# Patient Record
Sex: FEMALE | Race: WHITE | NOT HISPANIC OR LATINO | Employment: UNEMPLOYED | URBAN - METROPOLITAN AREA
[De-identification: names, ages, dates, MRNs, and addresses within clinical notes are randomized per-mention and may not be internally consistent; named-entity substitution may affect disease eponyms.]

---

## 2023-03-04 NOTE — PROGRESS NOTES
[facility]  MD Tenisha Mullins, NP-C    Chief Complaint:   Shauna Soto is a 62 y.o. female presenting today for her a new patient appointment to establish breast care.. She is due for Physical Examination.  She reports no interval changes.     History of Present Illness:   Mrs. Soto first presents on 2023 to establish breast care given her bilateral mastectomy (Dec 2019)by Dr. Keita due to being CHEK2 Positive. MD:::Cruz Chang MD    Past Medical History:   Diagnosis Date    Anxiety     Biallelic mutation of CHEK2 gene     Depression     Hypothyroidism     Insulin resistance     Osteopenia 08/15/2022    Osteopenia of spine Normal Hip    Vitamin D deficiency       Past Surgical History:   Procedure Laterality Date    BILATERAL MASTECTOMY Bilateral 2019    CHEK2 gene    BILATERAL TUBAL LIGATION  1993    BREAST REVISION SURGERY       SECTION  1982     SECTION  1985    WISDOM TOOTH EXTRACTION          Current Outpatient Medications:     azelastine (ASTELIN) 137 mcg (0.1 %) nasal spray, SPRAY 1 SPRAY INTO EACH NOSTRIL TWICE A DAY AS DIRECTED, Disp: , Rfl:     estradioL (ESTRACE) 0.01 % (0.1 mg/gram) vaginal cream, Insert 1 gram vaginally twice weekly, Disp: 42.5 g, Rfl: 5    levothyroxine (SYNTHROID) 112 MCG tablet, Take 112 mcg by mouth once daily., Disp: , Rfl:     metFORMIN (GLUCOPHAGE-XR) 500 MG ER 24hr tablet, Take 1,500 mg by mouth once daily., Disp: , Rfl:    Review of patient's allergies indicates:   Allergen Reactions    Sulfa (sulfonamide antibiotics)     Cefprozil      Other reaction(s): Itching    Codeine      Other reaction(s): Itching    Penicillins      Other reaction(s): Itching      Social History     Tobacco Use    Smoking status: Former    Smokeless tobacco: Never   Substance Use Topics    Alcohol use: Not Currently      Family History   Problem Relation Age of Onset    Hypertension  Father     Diabetes Mellitus Father     Breast cancer Sister       Review of Systems   Genitourinary:  Negative for hot flashes.   Integumentary:  Negative for color change, rash, mole/lesion, breast mass, breast discharge and breast tenderness.   Breast: Negative for mass and tenderness   Physical Exam   Constitutional: She is cooperative.   HENT:   Head: Normocephalic.   Pulmonary/Chest: She exhibits no mass. Right breast exhibits no mass, no skin change and no tenderness. Left breast exhibits no mass, no skin change and no tenderness. No breast swelling.   Bilateral breasts are surgically absent and showed no signs of malignancy.    Abdominal: Normal appearance.   Genitourinary: No breast swelling.   Lymphadenopathy:        Right: No supraclavicular adenopathy present.        Left: No supraclavicular adenopathy present.   Neurological: She is alert.   Skin: No rash noted.        Assessment/Plan  1. Biallelic mutation of CHEK2 gene  Assessment & Plan:  We discussed her PE findings and the statistics regarding bilateral prophylactic mastectomy with her mutation.  She knows that she has reduced her risk of a future breast cancer by about 95% (but not 100%).  She knows that she has minimal breast tissue left behind at mastectomy - but this tissue can become cancerous.  Due to this, she will need to continue monthly SBE and report any and all changes in the interim.  She will continue to be followed on an annual basis.                 Medical Decision Making:  It is my impression that this patient suffers all conditions contained in this medical document.  Each of these conditions did affect our plan of care and my medical decision making today.  It is my opinion that the medical decision making concerning this patient was of minimal  difficulty based on the aforementioned conditions.  Any further recommendations will be communicated to the patient by me.  I have reviewed and verified her allergies, list of  medications, medical and surgical histories, social history, and a pertinent review of symptoms.         Follow up:  1 year and prn    For:  PE

## 2023-03-04 NOTE — ASSESSMENT & PLAN NOTE
We discussed her PE findings and the statistics regarding bilateral prophylactic mastectomy with her mutation.  She knows that she has reduced her risk of a future breast cancer by about 95% (but not 100%).  She knows that she has minimal breast tissue left behind at mastectomy - but this tissue can become cancerous.  Due to this, she will need to continue monthly SBE and report any and all changes in the interim.  She will continue to be followed on an annual basis.

## 2023-03-06 ENCOUNTER — OFFICE VISIT (OUTPATIENT)
Dept: SURGERY | Facility: CLINIC | Age: 63
End: 2023-03-06
Payer: COMMERCIAL

## 2023-03-06 VITALS — WEIGHT: 150 LBS | HEIGHT: 68 IN | BODY MASS INDEX: 22.73 KG/M2

## 2023-03-06 DIAGNOSIS — Z15.01 BIALLELIC MUTATION OF CHEK2 GENE: ICD-10-CM

## 2023-03-06 DIAGNOSIS — Z15.09 BIALLELIC MUTATION OF CHEK2 GENE: ICD-10-CM

## 2023-03-06 DIAGNOSIS — Z15.89 BIALLELIC MUTATION OF CHEK2 GENE: ICD-10-CM

## 2023-03-06 PROCEDURE — 3008F PR BODY MASS INDEX (BMI) DOCUMENTED: ICD-10-PCS | Mod: CPTII,S$GLB,, | Performed by: SPECIALIST

## 2023-03-06 PROCEDURE — 3008F BODY MASS INDEX DOCD: CPT | Mod: CPTII,S$GLB,, | Performed by: SPECIALIST

## 2023-03-06 PROCEDURE — 1159F PR MEDICATION LIST DOCUMENTED IN MEDICAL RECORD: ICD-10-PCS | Mod: CPTII,S$GLB,, | Performed by: SPECIALIST

## 2023-03-06 PROCEDURE — 99203 OFFICE O/P NEW LOW 30 MIN: CPT | Mod: S$GLB,,, | Performed by: SPECIALIST

## 2023-03-06 PROCEDURE — 99203 PR OFFICE/OUTPT VISIT, NEW, LEVL III, 30-44 MIN: ICD-10-PCS | Mod: S$GLB,,, | Performed by: SPECIALIST

## 2023-03-06 PROCEDURE — 1160F PR REVIEW ALL MEDS BY PRESCRIBER/CLIN PHARMACIST DOCUMENTED: ICD-10-PCS | Mod: CPTII,S$GLB,, | Performed by: SPECIALIST

## 2023-03-06 PROCEDURE — 1160F RVW MEDS BY RX/DR IN RCRD: CPT | Mod: CPTII,S$GLB,, | Performed by: SPECIALIST

## 2023-03-06 PROCEDURE — 1159F MED LIST DOCD IN RCRD: CPT | Mod: CPTII,S$GLB,, | Performed by: SPECIALIST

## 2023-05-03 ENCOUNTER — PATIENT MESSAGE (OUTPATIENT)
Dept: RESEARCH | Facility: HOSPITAL | Age: 63
End: 2023-05-03
Payer: COMMERCIAL

## 2024-04-24 NOTE — PROGRESS NOTES
Date of Service: 5/15/2024    Chief Complaint:   Shauna Soto is a 63 y.o. female presenting today for her annual evaluation.  She is due for a Physical Examination.  She reports no interval changes.     History of Present Illness: Mrs. Soto first presents on 2023 to establish breast care given her bilateral mastectomy (Dec 2019)by Dr. Keita due to being CHEK2 Positive. MD:::Cruz Chang MD    Past Medical History:   Diagnosis Date    Anxiety     Biallelic mutation of CHEK2 gene     Depression     History of abnormal cervical Papanicolaou smear 2023    ASCUS Negative HPV    Hypothyroidism     Insulin resistance     Osteopenia 08/15/2022    Osteopenia of spine Normal Hip    Vitamin D deficiency       Past Surgical History:   Procedure Laterality Date    BILATERAL MASTECTOMY Bilateral 2019    CHEK2 gene    BILATERAL TUBAL LIGATION  1993    BREAST REVISION SURGERY       SECTION  1982     SECTION  1985    WISDOM TOOTH EXTRACTION          Current Outpatient Medications:     estradioL (ESTRACE) 0.01 % (0.1 mg/gram) vaginal cream, INSERT 1 GRAM VAGINALLY TWICE WEEKLY, Disp: 127.5 g, Rfl: 1    levothyroxine (SYNTHROID) 100 MCG tablet, Take 100 mcg by mouth., Disp: , Rfl:     metFORMIN (GLUCOPHAGE-XR) 500 MG ER 24hr tablet, Take 1,500 mg by mouth once daily., Disp: , Rfl:    Review of patient's allergies indicates:   Allergen Reactions    Sulfa (sulfonamide antibiotics)     Cefprozil      Other reaction(s): Itching    Codeine      Other reaction(s): Itching    Penicillins      Other reaction(s): Itching      Social History     Tobacco Use    Smoking status: Former    Smokeless tobacco: Never   Substance Use Topics    Alcohol use: Not Currently      Family History   Problem Relation Name Age of Onset    Hypertension Father      Diabetes Mellitus Father      Breast cancer Sister          Review of Systems   Integumentary:  Negative for color change,  rash, mole/lesion, thickening/swelling, dimpling of skin, drainage  Breast: Negative for mass and tenderness     Physical Exam   Constitutional: She is cooperative.   HENT:   Head: Normocephalic.   Pulmonary/Chest: She exhibits no mass. Right breast exhibits no mass, no skin change and no tenderness. Left breast exhibits no mass, no skin change and no tenderness. No breast swelling.   Bilateral breasts are surgically absent and are without signs of malignancy/recurrence.   Abdominal: Normal appearance.   Genitourinary: No breast swelling.   Musculoskeletal: Lymphadenopathy:      Upper Body:      Right upper body: No supraclavicular or axillary adenopathy.      Left upper body: No supraclavicular or axillary adenopathy.     Neurological: She is alert.   Skin: No rash noted.       ASSESSMENT and PLAN OF CARE     1. Biallelic mutation of CHEK2 gene  Assessment & Plan:  We discussed her PE findings and the statistics regarding bilateral prophylactic mastectomy with her mutation.  She knows that she has reduced her risk of a future breast cancer by about 95% (but not 100%).  She knows that she has minimal breast tissue left behind at mastectomy - but this tissue can become cancerous.  Due to this, she will need to continue monthly SBE and report any and all changes in the interim.  She will continue to be followed on an annual basis.        Medical Decision Making: It is my impression that this patient suffers all conditions contained in this medical document.  Each of these conditions did affect our plan of care and my medical decision making today.  It is my opinion that the medical decision making concerning this patient was of minimal  difficulty based on the aforementioned conditions.  Any further recommendations will be communicated to the patient by me.  I have reviewed and verified her allergies, list of medications, medical and surgical histories, social history, and a pertinent review of symptoms.     Follow  up: 1 year and PRN    For: Physical Examination

## 2024-04-24 NOTE — PROGRESS NOTES
Date of Service: 5/15/2024    Chief Complaint:   Shauna Soto is a 63 y.o. female presenting today for her annual follow up to her breast cancer diagnosis.  She is due for a Physical Examination.  She reports no interval changes.     History of Present Illness: Mrs. Soto first presents on 2023 to establish breast care given her bilateral mastectomy (Dec 2019)by Dr. Keita due to being CHEK2 Positive. MD:::Cruz Chang MD      Past Medical History:   Diagnosis Date    Anxiety     Biallelic mutation of CHEK2 gene     Depression     History of abnormal cervical Papanicolaou smear 2023    ASCUS Negative HPV    Hypothyroidism     Insulin resistance     Osteopenia 08/15/2022    Osteopenia of spine Normal Hip    Vitamin D deficiency       Past Surgical History:   Procedure Laterality Date    BILATERAL MASTECTOMY Bilateral 2019    CHEK2 gene    BILATERAL TUBAL LIGATION  1993    BREAST REVISION SURGERY       SECTION  1982     SECTION  1985    WISDOM TOOTH EXTRACTION          Current Outpatient Medications:     estradioL (ESTRACE) 0.01 % (0.1 mg/gram) vaginal cream, INSERT 1 GRAM VAGINALLY TWICE WEEKLY, Disp: 127.5 g, Rfl: 1    levothyroxine (SYNTHROID) 100 MCG tablet, Take 100 mcg by mouth., Disp: , Rfl:     metFORMIN (GLUCOPHAGE-XR) 500 MG ER 24hr tablet, Take 1,500 mg by mouth once daily., Disp: , Rfl:    Review of patient's allergies indicates:   Allergen Reactions    Sulfa (sulfonamide antibiotics)     Cefprozil      Other reaction(s): Itching    Codeine      Other reaction(s): Itching    Penicillins      Other reaction(s): Itching      Social History     Tobacco Use    Smoking status: Former    Smokeless tobacco: Never   Substance Use Topics    Alcohol use: Not Currently      Family History   Problem Relation Name Age of Onset    Hypertension Father      Diabetes Mellitus Father      Breast cancer Sister          Review of Systems     Physical  Exam     ASSESSMENT and PLAN OF CARE     {There are no diagnoses linked to this encounter. (Refresh or delete this SmartLink)}Medical Decision Making:  It is my impression that this patient suffers all conditions contained in this medical document.  Each of these conditions did affect our plan of care and my medical decision making today.  It is my opinion that the medical decision making concerning this patient was of moderate difficulty based on the aforementioned conditions.  Any further recommendations will be communicated to the patient by me.  I have reviewed and verified her allergies, list of medications, medical and surgical histories, social history, and a pertinent review of symptoms.    Follow up: 1 year and PRN     For:{[facility] [id number]}

## 2024-05-15 ENCOUNTER — OFFICE VISIT (OUTPATIENT)
Dept: SURGERY | Facility: CLINIC | Age: 64
End: 2024-05-15
Payer: MEDICARE

## 2024-05-15 DIAGNOSIS — Z15.09 BIALLELIC MUTATION OF CHEK2 GENE: Primary | ICD-10-CM

## 2024-05-15 DIAGNOSIS — Z15.89 BIALLELIC MUTATION OF CHEK2 GENE: Primary | ICD-10-CM

## 2024-05-15 DIAGNOSIS — Z15.01 BIALLELIC MUTATION OF CHEK2 GENE: Primary | ICD-10-CM

## 2024-05-15 PROCEDURE — 1160F RVW MEDS BY RX/DR IN RCRD: CPT | Mod: CPTII,S$GLB,, | Performed by: SPECIALIST

## 2024-05-15 PROCEDURE — 1126F AMNT PAIN NOTED NONE PRSNT: CPT | Mod: CPTII,S$GLB,, | Performed by: SPECIALIST

## 2024-05-15 PROCEDURE — 3044F HG A1C LEVEL LT 7.0%: CPT | Mod: CPTII,S$GLB,, | Performed by: SPECIALIST

## 2024-05-15 PROCEDURE — 1159F MED LIST DOCD IN RCRD: CPT | Mod: CPTII,S$GLB,, | Performed by: SPECIALIST

## 2024-05-15 PROCEDURE — 99999 PR PBB SHADOW E&M-EST. PATIENT-LVL II: CPT | Mod: PBBFAC,,, | Performed by: SPECIALIST

## 2024-05-15 PROCEDURE — 99213 OFFICE O/P EST LOW 20 MIN: CPT | Mod: S$GLB,,, | Performed by: SPECIALIST
